# Patient Record
Sex: FEMALE | Race: WHITE | Employment: FULL TIME | URBAN - METROPOLITAN AREA
[De-identification: names, ages, dates, MRNs, and addresses within clinical notes are randomized per-mention and may not be internally consistent; named-entity substitution may affect disease eponyms.]

---

## 2019-07-07 ENCOUNTER — HOSPITAL ENCOUNTER (EMERGENCY)
Facility: CLINIC | Age: 34
Discharge: HOME | End: 2019-07-07
Attending: EMERGENCY MEDICINE | Admitting: EMERGENCY MEDICINE
Payer: COMMERCIAL

## 2019-07-07 VITALS
RESPIRATION RATE: 18 BRPM | SYSTOLIC BLOOD PRESSURE: 143 MMHG | DIASTOLIC BLOOD PRESSURE: 90 MMHG | OXYGEN SATURATION: 100 % | HEART RATE: 57 BPM | TEMPERATURE: 98.5 F

## 2019-07-07 DIAGNOSIS — M54.2 NECK PAIN: ICD-10-CM

## 2019-07-07 DIAGNOSIS — M62.838 MUSCLE SPASM: ICD-10-CM

## 2019-07-07 PROCEDURE — 25000132 ZZH RX MED GY IP 250 OP 250 PS 637: Performed by: EMERGENCY MEDICINE

## 2019-07-07 PROCEDURE — 99283 EMERGENCY DEPT VISIT LOW MDM: CPT

## 2019-07-07 RX ORDER — HYDROCODONE BITARTRATE AND ACETAMINOPHEN 5; 325 MG/1; MG/1
2 TABLET ORAL ONCE
Status: COMPLETED | OUTPATIENT
Start: 2019-07-07 | End: 2019-07-07

## 2019-07-07 RX ORDER — HYDROCODONE BITARTRATE AND ACETAMINOPHEN 5; 325 MG/1; MG/1
1 TABLET ORAL EVERY 6 HOURS PRN
Qty: 15 TABLET | Refills: 0 | Status: SHIPPED | OUTPATIENT
Start: 2019-07-07 | End: 2019-07-10

## 2019-07-07 RX ORDER — DIAZEPAM 5 MG
5 TABLET ORAL ONCE
Status: COMPLETED | OUTPATIENT
Start: 2019-07-07 | End: 2019-07-07

## 2019-07-07 RX ORDER — CYCLOBENZAPRINE HCL 5 MG
5 TABLET ORAL 2 TIMES DAILY PRN
Qty: 15 TABLET | Refills: 0 | Status: SHIPPED | OUTPATIENT
Start: 2019-07-07 | End: 2019-07-16

## 2019-07-07 RX ORDER — IBUPROFEN 800 MG/1
800 TABLET, FILM COATED ORAL ONCE
Status: COMPLETED | OUTPATIENT
Start: 2019-07-07 | End: 2019-07-07

## 2019-07-07 RX ADMIN — IBUPROFEN 800 MG: 800 TABLET ORAL at 00:42

## 2019-07-07 RX ADMIN — HYDROCODONE BITARTRATE AND ACETAMINOPHEN 2 TABLET: 5; 325 TABLET ORAL at 02:16

## 2019-07-07 RX ADMIN — DIAZEPAM 5 MG: 5 TABLET ORAL at 00:42

## 2019-07-07 ASSESSMENT — ENCOUNTER SYMPTOMS
WEAKNESS: 0
NUMBNESS: 1
NECK PAIN: 1

## 2019-07-07 NOTE — ED PROVIDER NOTES
History     Chief Complaint:  Neck Pain    HPI   Darlin Patel is a 34 year old female, with the past medical history as noted below, who presents with family for evaluation of gradually worsening atraumatic neck pain (left side) for the last 5 days. Patient states that she has been using Aleve for her pain with minimal relief and last dose two one day ago. 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 She denies any unilateral weakness, vision changes or other complaint.   Patient works at [employer].  Numbness in the left hand.  No weakness in the hands.  No trauma.  No headache.  Tight muscles in the left posterior neck area.    Allergies:  No Known Drug Allergies     Medications:    The patient is not currently taking any prescribed medications.     Past Medical History:    Kidney stones  Anxiety    Past Surgical History:    History reviewed. No pertinent past surgical history.     Family History:    Mother -  Breast cancer  Father - leukemia    Social History:  The patient was accompanied to the ED by family.  Smoking Status: Yes - current every day smoker  Smokeless Tobacco: N/A  Alcohol Use: No  Drug Use: No   Marital Status:  Single [1]     Review of Systems   Eyes: Negative for visual disturbance.   Musculoskeletal: Positive for neck pain.   Neurological: Positive for numbness. Negative for weakness.   All other systems reviewed and are negative.    Neck pain started Monday night. Have been Aleeve that helps minimally. Woke up tonighty and unable to move neck, left arm/shoulder due to pain, numbness to left thumb. Denies any unilateral weakness, vision changes,    Physical Exam   Vitals:  Patient Vitals for the past 24 hrs:   BP Temp Temp src Pulse Heart Rate Resp SpO2   07/07/19 0014 (!) 148/100 98.5  F (36.9  C) Oral 64 64 18 100 %       Physical Exam   Neck:           GEN: patient appears tired  HEAD: atraumatic, normocephalic  EYES: pupils reactive, conjunctivae normal  ENT: TMs flat and white bilaterally, oropharynx normal with no erythema or exudate, mucus membranes moist,  NECK: no posterior midline tenderness, no meningeal signs, left paraspinous neck area with tightness and muscle spasm.  Pain with lateral bend and twisting  RESPIRATORY: no tachypnea, breath sounds clear to auscultation (no rales, wheezes, rhonchi)  CVS: normal S1/S2, no murmurs/rubs/gallops  ABDOMEN: soft, nontender, no masses or organomegaly, no rebound, decreased bowel sounds  BACK: no lesions  EXTREMITIES: intact pulses x 4, full range of motion at joints, no edema  SKIN: warm and dry, no acute rashes   NEURO: GCS 15, cranial nerves intact.  Motor- moves all 4 extremities with 5/5 strength,  5/5.  DF and PF 5/5.  Sensation- intact all UE dermatones to pinprick and light touch. Coordination- ambulatory  Overall symmetrical exam  Later in the ED patient able to lift arms over head and lateral bend, twist at the neck  HEME: no bruising or petechiae/contusions  LYMPH: no lymphadenopathy      Emergency Department Course     Interventions:  0042 Valium 5 mg PO  0042 Ibuprofen 800 mg PO  0216 Norco 5-325 mg per tablet 2 tablets PO     Emergency Department Course:  Nursing notes and vitals reviewed.    (0031)   I performed an exam of the patient as documented above. History obtained from patient.    (0303)   Updated by RN that patient feels improved and would like to go home.     (0322)   Patient reports that she feels improved and is ready to go home. Discussed plan of care and patient will be discharged.     I discussed the treatment plan with the patient. They expressed understanding of this plan and consented to discharge. They will be discharged home with instructions for care and follow up. In addition, the patient will return to the emergency department if their  symptoms persist, worsen, if new symptoms arise or if there is any concern.  All questions were answered prior to discharge.    BP (!) 148/100   Pulse 64   Temp 98.5  F (36.9  C) (Oral)   Resp 18   SpO2 100%   Work note given to the patient    /90   Pulse 57   Temp 98.5  F (36.9  C) (Oral)   Resp 18   SpO2 100%       Impression & Plan      Medical Decision Making:  Darlin is a pleasant 34 year old female who described atraumatic left sided neck pain that is getting worse with movement. She also has numbness in her left fingers, but otherwise her neurologic exam is intact including sensation. Spasm is noted.  We have given her  pain medicines and a muscle relaxer here in the ED with improvement of her symptoms.  Plan- prescribe muscle relaxer and will have her follow up with primary care. We will put in a referral for physical therapy and she should not do no heavy lifting. Continue to hydrate, push fluids and understands the reasons to return.     Neuro intact at the time of discharge.    Diagnosis:    ICD-10-CM    1. Neck pain M54.2    2. Muscle spasm M62.838         Disposition:   Discharged.    Discharge Medications:     Medication List      Started    cyclobenzaprine 5 MG tablet  Commonly known as:  FLEXERIL  5 mg, Oral, 2 TIMES DAILY PRN     HYDROcodone-acetaminophen 5-325 MG tablet  Commonly known as:  NORCO  1 tablet, Oral, EVERY 6 HOURS PRN          Instructions to patient:  You will be sore!    Ice to the area.  Motrin (ie ibuprofen) or tylenol for mild pain.  Norco (ie tylenol with narcotic) for severe pain.  Followup with clinic on Monday if the pain returns.    PT referral.    Scribe Disclosure:  Zara SERVIN, am serving as a scribe at 12:30 AM on 7/7/2019 to document services personally performed by Kristina Mendoza MD, based on my observations and the provider's statements to me.  7/7/2019   [facility] EMERGENCY DEPARTMENT       Kristina Mendoza MD  07/07/19  [id number]

## 2019-07-07 NOTE — LETTER
[facility] EMERGENCY DEPARTMENT  [address]  [facility] [address]  [phone]    Darlin GUIDO Amanda  [address]   [place] [address]  [phone] (home) none (work)    : 1985      To Whom it may concern:    Darlin Patel was seen in our Emergency Department today, 2019 for an injury.  No lifting > 1# and off work Monday/Tues if needed.    Sincerely,    Dr Mendoza

## 2019-07-07 NOTE — LETTER
[facility] EMERGENCY DEPARTMENT  [address] [place] [address]  [phone]    Darlin Patel  [address]   [place] [address]  [phone] (home) none (work)    : 1985      To Whom it may concern:    Darlin Patel was seen in our Emergency Department today, 2019 for an injury.  Off work until cleared by PCP or occupation/physical therapy.    Can do light duty with no lifting > 5#    Sincerely,    Dr Mendoza

## 2019-07-07 NOTE — ED TRIAGE NOTES
Neck pain started Monday night. Have been Aleeve that helps minimally. Woke up tonighty and unable to move neck, left arm/shoulder due to pain, numbness to left thumb. Denies any unilateral weakness, vision changes, and no other neuro deficit. ABCs intact.

## 2019-07-07 NOTE — ED AVS SNAPSHOT
[facility] Emergency Department  [address]  [facility] [address]  Phone:  [phone]  Fax:  [phone]                                    Darlin Patel   MRN: [id number]    Department:  [facility] Emergency Department   Date of Visit:  7/7/2019           After Visit Summary Signature Page    I have received my discharge instructions, and my questions have been answered. I have discussed any challenges I see with this plan with the nurse or doctor.    ..........................................................................................................................................  Patient/Patient Representative Signature      ..........................................................................................................................................  Patient Representative Print Name and Relationship to Patient    ..................................................               ................................................  Date                                   Time    ..........................................................................................................................................  Reviewed by Signature/Title    ...................................................              ..............................................  Date                                               Time          22EPIC Rev 08/18

## 2019-07-07 NOTE — DISCHARGE INSTRUCTIONS
You will be sore!    Ice to the area.  Motrin (ie ibuprofen) or tylenol for mild pain.  Norco (ie tylenol with narcotic) for severe pain.  Followup with clinic on Monday if the pain returns.    PT referral.